# Patient Record
Sex: FEMALE | Race: WHITE | NOT HISPANIC OR LATINO | URBAN - METROPOLITAN AREA
[De-identification: names, ages, dates, MRNs, and addresses within clinical notes are randomized per-mention and may not be internally consistent; named-entity substitution may affect disease eponyms.]

---

## 2023-01-20 ENCOUNTER — HOSPITAL ENCOUNTER (EMERGENCY)
Facility: CLINIC | Age: 63
Discharge: HOME | End: 2023-01-20
Attending: EMERGENCY MEDICINE | Admitting: EMERGENCY MEDICINE

## 2023-01-20 VITALS
TEMPERATURE: 97.9 F | RESPIRATION RATE: 16 BRPM | OXYGEN SATURATION: 96 % | WEIGHT: 215 LBS | DIASTOLIC BLOOD PRESSURE: 96 MMHG | SYSTOLIC BLOOD PRESSURE: 173 MMHG | HEART RATE: 111 BPM | BODY MASS INDEX: 30.78 KG/M2 | HEIGHT: 70 IN

## 2023-01-20 DIAGNOSIS — T18.108A ESOPHAGEAL FOREIGN BODY, INITIAL ENCOUNTER: ICD-10-CM

## 2023-01-20 PROCEDURE — 99283 EMERGENCY DEPT VISIT LOW MDM: CPT

## 2023-01-20 PROCEDURE — 255N000001 HC RX 255: Performed by: EMERGENCY MEDICINE

## 2023-01-20 RX ORDER — PANTOPRAZOLE SODIUM 40 MG/1
40 TABLET, DELAYED RELEASE ORAL DAILY
Qty: 30 TABLET | Refills: 0 | Status: SHIPPED | OUTPATIENT
Start: 2023-01-20 | End: 2023-02-19

## 2023-01-20 RX ADMIN — ANTACID/ANTIFLATULENT 4 G: 380; 550; 10; 10 GRANULE, EFFERVESCENT ORAL at 22:17

## 2023-01-21 NOTE — ED TRIAGE NOTES
Ate salmon around 1800 and feels it may be stuck in throat. Able to swallow some secretions.     Triage Assessment     Row Name 01/20/23 7823       Triage Assessment (Adult)    Airway WDL WDL       Respiratory WDL    Respiratory WDL WDL       Skin Circulation/Temperature WDL    Skin Circulation/Temperature WDL WDL       Cardiac WDL    Cardiac WDL WDL       Peripheral/Neurovascular WDL    Peripheral Neurovascular WDL WDL       Cognitive/Neuro/Behavioral WDL    Cognitive/Neuro/Behavioral WDL WDL

## 2023-01-21 NOTE — ED PROVIDER NOTES
"  History     Chief Complaint:  Foot Bolus    HPI   Carol Krishnamurthy is a 62 year old female who presents with food stuck in her throat since 6pm after she ate some salmon. She feels like there is inflammation in her throat and that there was a bolus sensation that she describes as an egg in the back on her throat. She tried drinking Coca Cola and juice with no relief.  She denies prior history of endoscopy but does report she has had similar symptoms in the past.  Typically the food has passed on its own after drinking fluids.    Independent Historian: the patient     Review of External Notes: Reviewed Care Everywhere and additional history found      ROS:  Review of Systems   HENT:        Food bolus   10 point review of systems performed and is negative except as above and in HPI.    Allergies:  Penicillin    Medications:    The patient is currently on no regular medications.    Past Medical History:    No past medical history    Social History:  The patient presents alone  PCP: No primary care provider on file.     Physical Exam     Patient Vitals for the past 24 hrs:   BP Temp Temp src Pulse Resp SpO2 Height Weight   01/20/23 2214 (!) 173/96 97.9  F (36.6  C) Temporal 111 16 96 % 1.778 m (5' 10\") 97.5 kg (215 lb)        Physical Exam  General: Alert, Moderately uncomfortable.  Head: No signs of trauma.   Mouth/Throat: Oropharynx moist.   Eyes: Conjunctivae are normal. Pupils are equal..   Neck: Normal range of motion.   GI: Abdomen is soft and nontender  CV: Appears well perfused.  Resp:No respiratory distress.   MSK: Normal range of motion. No obvious deformity.   Neuro: The patient is alert and interactive. BEGUM. Speech normal. GCS 15  Skin: No lesion or sign of trauma noted.   Psych: normal mood and affect. behavior is normal.     Emergency Department Course     Emergency Department Course & Assessments:       2210 I obtained a history and examined the patient   2352 I rechecked the " patient    Interventions:  Medications   sod bicarbonate-citric acid-simethicone (EZ GAS) 2.21-1.53-0.04 g packet 4 g (4 g Oral Given 1/20/23 2217)        Independent Interpretation (X-rays, CTs, rhythm strip):  n/a    Disposition:  The patient was discharged to home.     Impression & Plan      Medical Decision Making:  Carol Krishnamurthy is a 62 year old female who presents for evaluation of chest discomfort and vomiting.  This is consistent with esophageal bolus/foreign body esophagus.  The offending bolus is likely a piece of salmon.  We attempted passage with EZ gas.      This medication was effective in passage of the bolus.  Patient was able to drink a large amount of liquids and discomfort was gone indicating that the bolus has passed.  We will start patient on scheduled PPI and have them follow up with GI this week for endoscopy. Unclear if this is a stricture, mass or other etiology for the esophageal narrowing and patient understands this.  Will need formal diagnosis by endoscopy.   Liquid diet and very soft diet until endoscopy.      Diagnosis:    ICD-10-CM    1. Esophageal foreign body, initial encounter  T18.108A            Discharge Medications:  Discharge Medication List as of 1/20/2023 10:58 PM      START taking these medications    Details   pantoprazole (PROTONIX) 40 MG EC tablet Take 1 tablet (40 mg) by mouth daily for 30 doses, Disp-30 tablet, R-0, E-Prescribe              Scribe Disclosure:  Marilia AGOSTO, am serving as a scribe at 10:12 PM on 1/20/2023 to document services personally performed by Pilar Huang MD based on my observations and the provider's statements to me.    1/20/2023   Pilar Huang MD Debroux, Karah M, MD  01/21/23 7272